# Patient Record
Sex: FEMALE | Race: OTHER | NOT HISPANIC OR LATINO
[De-identification: names, ages, dates, MRNs, and addresses within clinical notes are randomized per-mention and may not be internally consistent; named-entity substitution may affect disease eponyms.]

---

## 2018-08-01 ENCOUNTER — APPOINTMENT (OUTPATIENT)
Dept: OBGYN | Facility: CLINIC | Age: 38
End: 2018-08-01
Payer: COMMERCIAL

## 2018-08-01 VITALS
SYSTOLIC BLOOD PRESSURE: 122 MMHG | BODY MASS INDEX: 27.09 KG/M2 | HEIGHT: 60 IN | DIASTOLIC BLOOD PRESSURE: 80 MMHG | WEIGHT: 138 LBS

## 2018-08-01 DIAGNOSIS — N92.6 IRREGULAR MENSTRUATION, UNSPECIFIED: ICD-10-CM

## 2018-08-01 DIAGNOSIS — Z87.42 PERSONAL HISTORY OF OTHER DISEASES OF THE FEMALE GENITAL TRACT: ICD-10-CM

## 2018-08-01 PROCEDURE — 76830 TRANSVAGINAL US NON-OB: CPT

## 2018-08-01 PROCEDURE — 99202 OFFICE O/P NEW SF 15 MIN: CPT | Mod: 25

## 2018-08-02 LAB
HCG SERPL-MCNC: 1975 MIU/ML
HPV HIGH+LOW RISK DNA PNL CVX: NOT DETECTED

## 2018-08-03 ENCOUNTER — OUTPATIENT (OUTPATIENT)
Dept: OUTPATIENT SERVICES | Facility: HOSPITAL | Age: 38
LOS: 1 days | End: 2018-08-03
Payer: COMMERCIAL

## 2018-08-03 VITALS
OXYGEN SATURATION: 98 % | TEMPERATURE: 97 F | RESPIRATION RATE: 16 BRPM | HEART RATE: 79 BPM | SYSTOLIC BLOOD PRESSURE: 118 MMHG | WEIGHT: 136.91 LBS | HEIGHT: 60 IN | DIASTOLIC BLOOD PRESSURE: 83 MMHG

## 2018-08-03 DIAGNOSIS — Z98.891 HISTORY OF UTERINE SCAR FROM PREVIOUS SURGERY: Chronic | ICD-10-CM

## 2018-08-03 DIAGNOSIS — Z01.818 ENCOUNTER FOR OTHER PREPROCEDURAL EXAMINATION: ICD-10-CM

## 2018-08-03 DIAGNOSIS — O02.1 MISSED ABORTION: ICD-10-CM

## 2018-08-03 PROCEDURE — 85027 COMPLETE CBC AUTOMATED: CPT

## 2018-08-03 PROCEDURE — 86900 BLOOD TYPING SEROLOGIC ABO: CPT

## 2018-08-03 PROCEDURE — G0463: CPT

## 2018-08-03 PROCEDURE — 86901 BLOOD TYPING SEROLOGIC RH(D): CPT

## 2018-08-03 PROCEDURE — 86850 RBC ANTIBODY SCREEN: CPT

## 2018-08-03 RX ORDER — BENZOYL PEROXIDE MICRONIZED 5.8 %
1 TOWELETTE (EA) TOPICAL
Qty: 0 | Refills: 0 | COMMUNITY

## 2018-08-03 RX ORDER — CHOLECALCIFEROL (VITAMIN D3) 125 MCG
1 CAPSULE ORAL
Qty: 0 | Refills: 0 | COMMUNITY

## 2018-08-03 RX ORDER — ASCORBIC ACID 60 MG
1 TABLET,CHEWABLE ORAL
Qty: 0 | Refills: 0 | COMMUNITY

## 2018-08-03 RX ORDER — NORETHINDRONE AND ETHINYL ESTRADIOL 0.4-0.035
1 KIT ORAL
Qty: 0 | Refills: 0 | COMMUNITY

## 2018-08-03 RX ORDER — LEVOTHYROXINE SODIUM 125 MCG
1 TABLET ORAL
Qty: 0 | Refills: 0 | COMMUNITY

## 2018-08-03 NOTE — H&P PST ADULT - ATTENDING COMMENTS
Patient with LMP 5/14 with positive pregnancy test and bleeding  Sonogram and endometrial biopsy(by another provider is consistent with POC  Patient for D & C for retained products of conception

## 2018-08-03 NOTE — H&P PST ADULT - HISTORY OF PRESENT ILLNESS
37 yr old female dx with missed  for surgery   Pt states she is bleeding and has spoken to gyn. Instructed pt if symptoms worsen call gyn again and or go to ER.

## 2018-08-06 ENCOUNTER — APPOINTMENT (OUTPATIENT)
Dept: OBGYN | Facility: CLINIC | Age: 38
End: 2018-08-06

## 2018-08-06 LAB — CYTOLOGY CVX/VAG DOC THIN PREP: NORMAL

## 2018-08-07 LAB — HCG SERPL-MCNC: 89 MIU/ML

## 2018-08-15 LAB — HCG SERPL-MCNC: 12 MIU/ML

## 2018-09-04 LAB — HCG SERPL-MCNC: 1 MIU/ML

## 2019-07-16 ENCOUNTER — OTHER (OUTPATIENT)
Age: 39
End: 2019-07-16

## 2019-07-17 RX ORDER — MEDROXYPROGESTERONE ACETATE 10 MG/1
10 TABLET ORAL
Qty: 10 | Refills: 0 | Status: ACTIVE | COMMUNITY
Start: 2019-07-16 | End: 1900-01-01

## 2022-07-26 PROBLEM — E03.9 HYPOTHYROIDISM, UNSPECIFIED: Chronic | Status: ACTIVE | Noted: 2018-08-03

## 2022-07-26 PROBLEM — R01.1 CARDIAC MURMUR, UNSPECIFIED: Chronic | Status: ACTIVE | Noted: 2018-08-03

## 2022-07-26 PROBLEM — O02.1 MISSED ABORTION: Chronic | Status: ACTIVE | Noted: 2018-08-03

## 2022-07-28 ENCOUNTER — NON-APPOINTMENT (OUTPATIENT)
Age: 42
End: 2022-07-28

## 2022-07-28 ENCOUNTER — APPOINTMENT (OUTPATIENT)
Dept: OPHTHALMOLOGY | Facility: CLINIC | Age: 42
End: 2022-07-28

## 2022-07-28 PROCEDURE — 92004 COMPRE OPH EXAM NEW PT 1/>: CPT

## 2022-07-28 PROCEDURE — 92285 EXTERNAL OCULAR PHOTOGRAPHY: CPT

## 2022-08-11 ENCOUNTER — APPOINTMENT (OUTPATIENT)
Dept: OPHTHALMOLOGY | Facility: CLINIC | Age: 42
End: 2022-08-11

## 2022-08-11 ENCOUNTER — NON-APPOINTMENT (OUTPATIENT)
Age: 42
End: 2022-08-11

## 2022-08-11 PROCEDURE — 92012 INTRM OPH EXAM EST PATIENT: CPT

## 2022-09-09 ENCOUNTER — NON-APPOINTMENT (OUTPATIENT)
Age: 42
End: 2022-09-09

## 2022-09-09 ENCOUNTER — APPOINTMENT (OUTPATIENT)
Dept: OPHTHALMOLOGY | Facility: CLINIC | Age: 42
End: 2022-09-09

## 2022-09-09 PROCEDURE — 92012 INTRM OPH EXAM EST PATIENT: CPT

## 2024-07-02 ENCOUNTER — APPOINTMENT (OUTPATIENT)
Dept: OPHTHALMOLOGY | Facility: CLINIC | Age: 44
End: 2024-07-02
Payer: COMMERCIAL

## 2024-07-02 ENCOUNTER — NON-APPOINTMENT (OUTPATIENT)
Age: 44
End: 2024-07-02

## 2024-07-02 PROCEDURE — 92014 COMPRE OPH EXAM EST PT 1/>: CPT

## 2025-06-25 NOTE — H&P PST ADULT - NSANTHOSAYNRD_GEN_A_CORE
97.8 No. BETH screening performed.  STOP BANG Legend: 0-2 = LOW Risk; 3-4 = INTERMEDIATE Risk; 5-8 = HIGH Risk